# Patient Record
Sex: MALE | Race: WHITE | NOT HISPANIC OR LATINO | URBAN - METROPOLITAN AREA
[De-identification: names, ages, dates, MRNs, and addresses within clinical notes are randomized per-mention and may not be internally consistent; named-entity substitution may affect disease eponyms.]

---

## 2019-09-20 ENCOUNTER — CARE COORDINATION (OUTPATIENT)
Dept: GASTROENTEROLOGY | Facility: CLINIC | Age: 59
End: 2019-09-20

## 2019-09-20 NOTE — PROGRESS NOTES
Care Coordination New Patient Referral  [facility]    NP referral date: 09/20/19  Referred to MD: COOPER Mckeon    Referring MD: Jeffry  Referring contact information see initial referral note   Referral for EUS    Call placed to Dr. Teran's office to [place]  To request additional information and he will check with Dr. Teran as he thinks this referral was to be canceled.  He will call me if we are to proceed.    Diagnosis: pancreas mass    Referral canceled.    Radha Contreras  BSN, HNBC  RN Care Coordinator  [facility]  Ph: [phone]  Email: [email or web address]